# Patient Record
Sex: MALE | Race: WHITE
[De-identification: names, ages, dates, MRNs, and addresses within clinical notes are randomized per-mention and may not be internally consistent; named-entity substitution may affect disease eponyms.]

---

## 2020-06-17 ENCOUNTER — HOSPITAL ENCOUNTER (EMERGENCY)
Dept: HOSPITAL 47 - EC | Age: 70
Discharge: HOME | End: 2020-06-17
Payer: OTHER GOVERNMENT

## 2020-06-17 VITALS — SYSTOLIC BLOOD PRESSURE: 130 MMHG | DIASTOLIC BLOOD PRESSURE: 97 MMHG | HEART RATE: 100 BPM | RESPIRATION RATE: 18 BRPM

## 2020-06-17 VITALS — TEMPERATURE: 98 F

## 2020-06-17 DIAGNOSIS — E78.5: ICD-10-CM

## 2020-06-17 DIAGNOSIS — D64.9: Primary | ICD-10-CM

## 2020-06-17 DIAGNOSIS — Z79.01: ICD-10-CM

## 2020-06-17 DIAGNOSIS — Z87.891: ICD-10-CM

## 2020-06-17 DIAGNOSIS — Z79.899: ICD-10-CM

## 2020-06-17 DIAGNOSIS — Z79.82: ICD-10-CM

## 2020-06-17 DIAGNOSIS — I10: ICD-10-CM

## 2020-06-17 LAB
ALBUMIN SERPL-MCNC: 4.3 G/DL (ref 3.5–5)
ALP SERPL-CCNC: 105 U/L (ref 38–126)
ALT SERPL-CCNC: 22 U/L (ref 4–49)
ANION GAP SERPL CALC-SCNC: 7 MMOL/L
APTT BLD: 28.9 SEC (ref 22–30)
AST SERPL-CCNC: 40 U/L (ref 17–59)
BASOPHILS # BLD AUTO: 0 K/UL (ref 0–0.2)
BASOPHILS NFR BLD AUTO: 0 %
BUN SERPL-SCNC: 14 MG/DL (ref 9–20)
CALCIUM SPEC-MCNC: 9.2 MG/DL (ref 8.4–10.2)
CHLORIDE SERPL-SCNC: 110 MMOL/L (ref 98–107)
CO2 SERPL-SCNC: 22 MMOL/L (ref 22–30)
EOSINOPHIL # BLD AUTO: 0.1 K/UL (ref 0–0.7)
EOSINOPHIL NFR BLD AUTO: 2 %
ERYTHROCYTE [DISTWIDTH] IN BLOOD BY AUTOMATED COUNT: 3.36 M/UL (ref 4.3–5.9)
ERYTHROCYTE [DISTWIDTH] IN BLOOD: 16.8 % (ref 11.5–15.5)
GLUCOSE SERPL-MCNC: 101 MG/DL (ref 74–99)
HCT VFR BLD AUTO: 28.9 % (ref 39–53)
HGB BLD-MCNC: 8.7 GM/DL (ref 13–17.5)
INR PPP: 1.8 (ref ?–1.2)
LYMPHOCYTES # SPEC AUTO: 1 K/UL (ref 1–4.8)
LYMPHOCYTES NFR SPEC AUTO: 24 %
MCH RBC QN AUTO: 26 PG (ref 25–35)
MCHC RBC AUTO-ENTMCNC: 30.2 G/DL (ref 31–37)
MCV RBC AUTO: 86 FL (ref 80–100)
MONOCYTES # BLD AUTO: 0.4 K/UL (ref 0–1)
MONOCYTES NFR BLD AUTO: 10 %
NEUTROPHILS # BLD AUTO: 2.4 K/UL (ref 1.3–7.7)
NEUTROPHILS NFR BLD AUTO: 60 %
PLATELET # BLD AUTO: 182 K/UL (ref 150–450)
POTASSIUM SERPL-SCNC: 4.6 MMOL/L (ref 3.5–5.1)
PROT SERPL-MCNC: 7.9 G/DL (ref 6.3–8.2)
PT BLD: 17.6 SEC (ref 9–12)
SODIUM SERPL-SCNC: 139 MMOL/L (ref 137–145)
WBC # BLD AUTO: 4 K/UL (ref 3.8–10.6)

## 2020-06-17 PROCEDURE — 80053 COMPREHEN METABOLIC PANEL: CPT

## 2020-06-17 PROCEDURE — 99284 EMERGENCY DEPT VISIT MOD MDM: CPT

## 2020-06-17 PROCEDURE — 86901 BLOOD TYPING SEROLOGIC RH(D): CPT

## 2020-06-17 PROCEDURE — 36415 COLL VENOUS BLD VENIPUNCTURE: CPT

## 2020-06-17 PROCEDURE — 85610 PROTHROMBIN TIME: CPT

## 2020-06-17 PROCEDURE — 85025 COMPLETE CBC W/AUTO DIFF WBC: CPT

## 2020-06-17 PROCEDURE — 85730 THROMBOPLASTIN TIME PARTIAL: CPT

## 2020-06-17 PROCEDURE — 86900 BLOOD TYPING SEROLOGIC ABO: CPT

## 2020-06-17 PROCEDURE — 86850 RBC ANTIBODY SCREEN: CPT

## 2020-06-17 NOTE — ED
General Adult HPI





- General


Chief complaint: Recheck/Abnormal Lab/Rx


Stated complaint: needs blood transfusion


Time Seen by Provider: 06/17/20 12:19


Source: patient, RN notes reviewed


Mode of arrival: ambulatory


Limitations: no limitations





- History of Present Illness


Initial comments: 





69-year-old male presents emergency Department with chief complaint of anemia.  

Patient states that they have been monitoring this over the last several months 

in which it started to go back up and  recently dropped to 7.1.  Patient states 

that he's had occasional shortness of breath no headache or dizziness at this 

time.  Patient states he was recommended to emergency department yesterday but 

did not show up until today.  Patient has had upper and lower scopes done with 

no signs of bleeding the unsure why he has chronic anemia.  Patient does take 

Coumadin denies any melena, hematochezia, hematemesis or coffee-ground emesis.  

Patient has no abdominal pain no chest pain.  Patient denies any hematuria or 

dysuria





- Related Data


                                Home Medications











 Medication  Instructions  Recorded  Confirmed


 


Aspirin EC [Ecotrin Low Dose] 162 mg PO DAILY 06/17/20 06/17/20


 


Donepezil [Aricept] 10 mg PO DAILY 06/17/20 06/17/20


 


Ferrous Sulfate [Feosol] 325 mg PO HS 06/17/20 06/17/20


 


LORazepam [Ativan] 0.5 mg PO DAILY 06/17/20 06/17/20


 


Lisinopril [Prinivil] 10 mg PO DAILY 06/17/20 06/17/20


 


Metoprolol Tartrate [Lopressor] 50 mg PO BID 06/17/20 06/17/20


 


Pravastatin Sodium [Pravachol] 40 mg PO HS 06/17/20 06/17/20


 


Sertraline HCl [Zoloft] 200 mg PO DAILY 06/17/20 06/17/20


 


Warfarin [Coumadin] 5 mg PO HS 06/17/20 06/17/20


 


traZODone HCL [Desyrel] 100 mg PO HS 06/17/20 06/17/20











                                    Allergies











Allergy/AdvReac Type Severity Reaction Status Date / Time


 


No Known Allergies Allergy   Verified 06/17/20 12:50














Review of Systems


ROS Statement: 


Those systems with pertinent positive or pertinent negative responses have been 

documented in the HPI.





ROS Other: All systems not noted in ROS Statement are negative.





Past Medical History


Past Medical History: Coronary Artery Disease (CAD), Hyperlipidemia, 

Hypertension


Additional Past Medical History / Comment(s): anemia


History of Any Multi-Drug Resistant Organisms: None Reported


Past Surgical History: Appendectomy, Cholecystectomy, Coronary Bypass/CABG


Past Psychological History: No Psychological Hx Reported


Smoking Status: Former smoker


Past Alcohol Use History: Occasional


Past Drug Use History: None Reported





General Exam


Limitations: no limitations


General appearance: alert, in no apparent distress


Head exam: Present: atraumatic, normocephalic, normal inspection


Eye exam: Present: normal appearance, PERRL, EOMI.  Absent: scleral icterus, 

conjunctival injection, periorbital swelling


ENT exam: Present: normal exam, normal oropharynx, mucous membranes moist


Neck exam: Present: normal inspection, full ROM.  Absent: tenderness, 

meningismus, lymphadenopathy


Respiratory exam: Present: normal lung sounds bilaterally.  Absent: respiratory 

distress, wheezes, rales, rhonchi, stridor


Cardiovascular Exam: Present: regular rate, normal rhythm, normal heart sounds. 

 Absent: systolic murmur, diastolic murmur, rubs, gallop, clicks


GI/Abdominal exam: Present: soft, normal bowel sounds.  Absent: distended, 

tenderness, guarding, rebound, rigid


Back exam: Absent: CVA tenderness (R), CVA tenderness (L)


Neurological exam: Present: alert, oriented X3


Skin exam: Present: warm, dry, intact, normal color.  Absent: rash





Course


                                   Vital Signs











  06/17/20





  12:11


 


Temperature 98.0 F


 


Pulse Rate 106 H


 


Respiratory 16





Rate 


 


Blood Pressure 121/75


 


O2 Sat by Pulse 98





Oximetry 














Medical Decision Making





- Medical Decision Making





69-year-old male presented emergency department for anemia possible blood 

transfusions stated that his hemoglobin was 7.1.  Hemoglobin today is 8.7 is no 

signs of bleeding has had EGD and colonoscopy with no evidence of bleeding.  

Patient will follow-up with his pediatrician for any worsening symptoms.





- Lab Data


Result diagrams: 


                                 06/17/20 12:22





                                 06/17/20 12:22


                                   Lab Results











  06/17/20 06/17/20 06/17/20 Range/Units





  12:22 12:22 12:22 


 


WBC   4.0   (3.8-10.6)  k/uL


 


RBC   3.36 L   (4.30-5.90)  m/uL


 


Hgb   8.7 L   (13.0-17.5)  gm/dL


 


Hct   28.9 L   (39.0-53.0)  %


 


MCV   86.0   (80.0-100.0)  fL


 


MCH   26.0   (25.0-35.0)  pg


 


MCHC   30.2 L   (31.0-37.0)  g/dL


 


RDW   16.8 H   (11.5-15.5)  %


 


Plt Count   182   (150-450)  k/uL


 


Neutrophils %   60   %


 


Lymphocytes %   24   %


 


Monocytes %   10   %


 


Eosinophils %   2   %


 


Basophils %   0   %


 


Neutrophils #   2.4   (1.3-7.7)  k/uL


 


Lymphocytes #   1.0   (1.0-4.8)  k/uL


 


Monocytes #   0.4   (0-1.0)  k/uL


 


Eosinophils #   0.1   (0-0.7)  k/uL


 


Basophils #   0.0   (0-0.2)  k/uL


 


Hypochromasia   Marked   


 


Poikilocytosis   Slight   


 


Anisocytosis   Slight   


 


PT    17.6 H  (9.0-12.0)  sec


 


INR    1.8 H  (<1.2)  


 


APTT    28.9  (22.0-30.0)  sec


 


Sodium     (137-145)  mmol/L


 


Potassium     (3.5-5.1)  mmol/L


 


Chloride     ()  mmol/L


 


Carbon Dioxide     (22-30)  mmol/L


 


Anion Gap     mmol/L


 


BUN     (9-20)  mg/dL


 


Creatinine     (0.66-1.25)  mg/dL


 


Est GFR (CKD-EPI)AfAm     (>60 ml/min/1.73 sqM)  


 


Est GFR (CKD-EPI)NonAf     (>60 ml/min/1.73 sqM)  


 


Glucose     (74-99)  mg/dL


 


Calcium     (8.4-10.2)  mg/dL


 


Total Bilirubin     (0.2-1.3)  mg/dL


 


AST     (17-59)  U/L


 


ALT     (4-49)  U/L


 


Alkaline Phosphatase     ()  U/L


 


Total Protein     (6.3-8.2)  g/dL


 


Albumin     (3.5-5.0)  g/dL


 


Blood Type  A Positive    


 


Blood Type Confirm     


 


Blood Type Recheck  No Previous Record    


 


Bld Type Recheck Status  CABO Indicated    


 


Antibody Screen  NEGATIVE    


 


Spec Expiration Date  06/20/2020 - 2322    














  06/17/20 06/17/20 Range/Units





  12:22 12:24 


 


WBC    (3.8-10.6)  k/uL


 


RBC    (4.30-5.90)  m/uL


 


Hgb    (13.0-17.5)  gm/dL


 


Hct    (39.0-53.0)  %


 


MCV    (80.0-100.0)  fL


 


MCH    (25.0-35.0)  pg


 


MCHC    (31.0-37.0)  g/dL


 


RDW    (11.5-15.5)  %


 


Plt Count    (150-450)  k/uL


 


Neutrophils %    %


 


Lymphocytes %    %


 


Monocytes %    %


 


Eosinophils %    %


 


Basophils %    %


 


Neutrophils #    (1.3-7.7)  k/uL


 


Lymphocytes #    (1.0-4.8)  k/uL


 


Monocytes #    (0-1.0)  k/uL


 


Eosinophils #    (0-0.7)  k/uL


 


Basophils #    (0-0.2)  k/uL


 


Hypochromasia    


 


Poikilocytosis    


 


Anisocytosis    


 


PT    (9.0-12.0)  sec


 


INR    (<1.2)  


 


APTT    (22.0-30.0)  sec


 


Sodium  139   (137-145)  mmol/L


 


Potassium  4.6   (3.5-5.1)  mmol/L


 


Chloride  110 H   ()  mmol/L


 


Carbon Dioxide  22   (22-30)  mmol/L


 


Anion Gap  7   mmol/L


 


BUN  14   (9-20)  mg/dL


 


Creatinine  0.90   (0.66-1.25)  mg/dL


 


Est GFR (CKD-EPI)AfAm  >90   (>60 ml/min/1.73 sqM)  


 


Est GFR (CKD-EPI)NonAf  86   (>60 ml/min/1.73 sqM)  


 


Glucose  101 H   (74-99)  mg/dL


 


Calcium  9.2   (8.4-10.2)  mg/dL


 


Total Bilirubin  0.6   (0.2-1.3)  mg/dL


 


AST  40   (17-59)  U/L


 


ALT  22   (4-49)  U/L


 


Alkaline Phosphatase  105   ()  U/L


 


Total Protein  7.9   (6.3-8.2)  g/dL


 


Albumin  4.3   (3.5-5.0)  g/dL


 


Blood Type    


 


Blood Type Confirm   A Positive  


 


Blood Type Recheck    


 


Bld Type Recheck Status    


 


Antibody Screen    


 


Spec Expiration Date    














Disposition


Clinical Impression: 


 Anemia





Disposition: HOME SELF-CARE


Condition: Stable


Instructions (If sedation given, give patient instructions):  Anemia (ED)


Additional Instructions: 


Please return to the Emergency Department if symptoms worsen or any other 

concerns.


Is patient prescribed a controlled substance at d/c from ED?: No


Referrals: 


[facility] [Primary Care Provider] - 1-2 days


Time of Disposition: 13:36

## 2021-06-03 ENCOUNTER — HOSPITAL ENCOUNTER (OUTPATIENT)
Dept: HOSPITAL 47 - ORWHC2ENDO | Age: 71
End: 2021-06-03
Attending: INTERNAL MEDICINE
Payer: MEDICARE

## 2021-06-03 VITALS
HEART RATE: 103 BPM | DIASTOLIC BLOOD PRESSURE: 101 MMHG | TEMPERATURE: 97.8 F | RESPIRATION RATE: 18 BRPM | SYSTOLIC BLOOD PRESSURE: 138 MMHG

## 2021-06-03 VITALS — BODY MASS INDEX: 19.3 KG/M2

## 2021-06-03 DIAGNOSIS — K92.2: Primary | ICD-10-CM

## 2021-06-03 PROCEDURE — 91110 GI TRC IMG INTRAL ESOPH-ILE: CPT
